# Patient Record
Sex: FEMALE | Race: WHITE | NOT HISPANIC OR LATINO | Employment: OTHER | URBAN - METROPOLITAN AREA
[De-identification: names, ages, dates, MRNs, and addresses within clinical notes are randomized per-mention and may not be internally consistent; named-entity substitution may affect disease eponyms.]

---

## 2018-10-30 ENCOUNTER — OFFICE VISIT (OUTPATIENT)
Dept: INTERNAL MEDICINE | Facility: CLINIC | Age: 58
End: 2018-10-30
Payer: COMMERCIAL

## 2018-10-30 ENCOUNTER — HOSPITAL ENCOUNTER (EMERGENCY)
Facility: HOSPITAL | Age: 58
Discharge: HOME | End: 2018-10-30
Attending: EMERGENCY MEDICINE
Payer: COMMERCIAL

## 2018-10-30 VITALS
SYSTOLIC BLOOD PRESSURE: 131 MMHG | HEART RATE: 90 BPM | DIASTOLIC BLOOD PRESSURE: 74 MMHG | OXYGEN SATURATION: 99 % | WEIGHT: 132 LBS | TEMPERATURE: 98 F | BODY MASS INDEX: 21.21 KG/M2 | RESPIRATION RATE: 18 BRPM | HEIGHT: 66 IN

## 2018-10-30 VITALS
DIASTOLIC BLOOD PRESSURE: 90 MMHG | WEIGHT: 140.88 LBS | HEART RATE: 115 BPM | HEIGHT: 64 IN | OXYGEN SATURATION: 100 % | BODY MASS INDEX: 24.05 KG/M2 | SYSTOLIC BLOOD PRESSURE: 140 MMHG

## 2018-10-30 DIAGNOSIS — G89.29 CHRONIC BILATERAL LOW BACK PAIN WITH BILATERAL SCIATICA: Primary | ICD-10-CM

## 2018-10-30 DIAGNOSIS — F41.9 ANXIETY: Primary | ICD-10-CM

## 2018-10-30 DIAGNOSIS — M54.41 CHRONIC BILATERAL LOW BACK PAIN WITH BILATERAL SCIATICA: Primary | ICD-10-CM

## 2018-10-30 DIAGNOSIS — M54.40 ACUTE LEFT-SIDED LOW BACK PAIN WITH SCIATICA, SCIATICA LATERALITY UNSPECIFIED: ICD-10-CM

## 2018-10-30 DIAGNOSIS — M54.42 CHRONIC BILATERAL LOW BACK PAIN WITH BILATERAL SCIATICA: Primary | ICD-10-CM

## 2018-10-30 PROCEDURE — 99283 EMERGENCY DEPT VISIT LOW MDM: CPT | Mod: ,,, | Performed by: NURSE PRACTITIONER

## 2018-10-30 PROCEDURE — 25000003 PHARM REV CODE 250: Performed by: NURSE PRACTITIONER

## 2018-10-30 PROCEDURE — 99999 PR PBB SHADOW E&M-EST. PATIENT-LVL III: CPT | Mod: PBBFAC,,, | Performed by: STUDENT IN AN ORGANIZED HEALTH CARE EDUCATION/TRAINING PROGRAM

## 2018-10-30 PROCEDURE — 3008F BODY MASS INDEX DOCD: CPT | Mod: CPTII,S$GLB,, | Performed by: STUDENT IN AN ORGANIZED HEALTH CARE EDUCATION/TRAINING PROGRAM

## 2018-10-30 PROCEDURE — 99203 OFFICE O/P NEW LOW 30 MIN: CPT | Mod: S$GLB,,, | Performed by: STUDENT IN AN ORGANIZED HEALTH CARE EDUCATION/TRAINING PROGRAM

## 2018-10-30 PROCEDURE — 99283 EMERGENCY DEPT VISIT LOW MDM: CPT

## 2018-10-30 RX ORDER — HYDROCODONE BITARTRATE AND ACETAMINOPHEN 5; 325 MG/1; MG/1
1 TABLET ORAL
Status: COMPLETED | OUTPATIENT
Start: 2018-10-30 | End: 2018-10-30

## 2018-10-30 RX ORDER — METHOCARBAMOL 750 MG/1
750 TABLET, FILM COATED ORAL 3 TIMES DAILY
Qty: 42 TABLET | Refills: 0 | Status: SHIPPED | OUTPATIENT
Start: 2018-10-30 | End: 2018-11-13

## 2018-10-30 RX ORDER — CLONAZEPAM 1 MG/1
0.5 TABLET ORAL 2 TIMES DAILY PRN
Qty: 30 TABLET | Refills: 0 | Status: SHIPPED | OUTPATIENT
Start: 2018-10-30 | End: 2019-01-05 | Stop reason: SDUPTHER

## 2018-10-30 RX ADMIN — HYDROCODONE BITARTRATE AND ACETAMINOPHEN 1 TABLET: 5; 325 TABLET ORAL at 10:10

## 2018-10-30 NOTE — ED TRIAGE NOTES
Patient presents with c/o lumbar back pain that radiates down bilateral lower extremities. Patient reports the pain started around two weeks ago. Patient reports numbness and tingling to BLE. Patient ambulating without difficulty. Patient describes the pain as sharp and burning. Patient rates the pain 6/10 at this time. Patient is A&Ox4 and following commands.

## 2018-10-30 NOTE — PROGRESS NOTES
"Subjective:       Patient ID: Ruby Angela is a 58 y.o. female.    Chief Complaint: Anxiety and Back Pain    HPI   58-year-old female with medical history significant for anxiety who is presenting as an urgent care visit for anxiety and lower back pain. The patient states she is in town with her  who is here for a liver transplant (second transplant) and is extremely anxious as a result of this. She states since he's going for his second transplant she is worried about it. Regarding her lower back pain she states the back pain has been on and off x 3 months, radiates down her left leg to her great toe. She has not tired anything for her back pain; is allergic to naproxen/NSAIDS.       Denies bowel or bladder incontinence, numbness, tingling, difficulty ambulating.        Review of Systems   Constitutional: Positive for fatigue. Negative for chills and fever.   HENT: Positive for rhinorrhea. Negative for postnasal drip, sore throat, trouble swallowing and voice change.    Eyes: Negative for photophobia and visual disturbance.   Respiratory: Negative for cough, chest tightness and shortness of breath.    Cardiovascular: Negative for chest pain, palpitations and leg swelling.   Gastrointestinal: Negative for abdominal pain, constipation, diarrhea, nausea and vomiting.   Endocrine: Negative for polydipsia, polyphagia and polyuria.   Genitourinary: Negative for dysuria and hematuria.   Musculoskeletal: Positive for back pain. Negative for neck pain and neck stiffness.   Skin: Negative for color change and rash.   Neurological: Negative for weakness and numbness.   Psychiatric/Behavioral: Negative for confusion, decreased concentration and hallucinations. The patient is nervous/anxious.        Objective:       Vitals:    10/30/18 1524   BP: (!) 140/90   BP Location: Right arm   Patient Position: Sitting   BP Method: Medium (Manual)   Pulse: (!) 115   SpO2: 100%   Weight: 63.9 kg (140 lb 14 oz)   Height: 5' 4" " (1.626 m)       Physical Exam   Constitutional: She is oriented to person, place, and time. She appears well-developed and well-nourished.   HENT:   Head: Normocephalic and atraumatic.   Eyes: EOM are normal. Pupils are equal, round, and reactive to light.   Neck: Normal range of motion. Neck supple.   Cardiovascular: Normal rate, regular rhythm and normal heart sounds.   Pulmonary/Chest: Effort normal and breath sounds normal.   Abdominal: Soft. Bowel sounds are normal.   Musculoskeletal: Normal range of motion. She exhibits no edema.   Neurological: She is alert and oriented to person, place, and time.   Psychiatric: Her speech is normal and behavior is normal. Judgment and thought content normal. Her mood appears anxious. She is not actively hallucinating. She is attentive.       Assessment:       1. Anxiety    2. Acute left-sided low back pain with sciatica, sciatica laterality unspecified        Plan:       Anxiety   - patient extremely anxious regarding her husbands health, tearful on physical exam.    - states she will be here for ~ 1 month and not able to return home to see her PCP   - will prescribe klonopin 0.5mg - 30 tablets    Low back pain/without acute neurological abnormalities   - robaxin    - patient states she is scheduled for an x-ray by her PCP but has not been able to get it as she has been taking care of her          Humberto Lim MD     Internal Medicine PGY-2       I have personally seen and examined patient and agree with the A/P as noted above.     Anca Adame

## 2018-10-30 NOTE — DISCHARGE INSTRUCTIONS
Our goal in the emergency department is to always give you outstanding care and exceptional service. You may receive a survey by mail or e-mail in the next week regarding your experience in our ED. We would greatly appreciate your completing and returning the survey. Your feedback provides us with a way to recognize our staff who give very good care and it helps us learn how to improve when your experience was below our aspiration of excellence.     Please follow up with internal medicine within the next week for your low back pain. Please return to the ED if you experience difficulty walking, numbness, tingling, fever or chills .

## 2018-10-30 NOTE — ED NOTES
LOC: The patient is awake, alert and aware of environment with an appropriate affect, the patient is oriented x 3 and speaking appropriately. PERRLA present. Patient reports numbness and tingling in BLE. Patient with symmetrical facial expression. Equal hand grasps bilaterally.  APPEARANCE: Patient resting comfortably and in no acute distress, patient is clean and well groomed, patient's clothing is properly fastened.  SKIN: The skin is warm and dry, patient has normal skin turgor and moist mucus membranes, skin intact, no breakdown or brusing noted.  MUSCULOSKELETAL: Patient moving all extremities well, no obvious swelling or deformities noted. Patient reports pain with ambulation.   RESPIRATORY: Airway is open and patent, respirations are spontaneous, patient has a normal effort and rate. Breath sounds are clear and equal bilaterally.  CARDIAC: Normal heart sounds. No peripheral edema. Denies chest pain and SOB.   ABDOMEN: Soft and non tender to palpation, no distention noted. Bowel sounds present. Denies NVD.

## 2018-11-07 ENCOUNTER — OFFICE VISIT (OUTPATIENT)
Dept: INTERNAL MEDICINE | Facility: CLINIC | Age: 58
End: 2018-11-07
Payer: COMMERCIAL

## 2018-11-07 VITALS
BODY MASS INDEX: 24.32 KG/M2 | OXYGEN SATURATION: 97 % | HEIGHT: 64 IN | HEART RATE: 98 BPM | DIASTOLIC BLOOD PRESSURE: 88 MMHG | SYSTOLIC BLOOD PRESSURE: 130 MMHG | TEMPERATURE: 99 F | WEIGHT: 142.44 LBS

## 2018-11-07 DIAGNOSIS — G43.909 MIGRAINE WITHOUT STATUS MIGRAINOSUS, NOT INTRACTABLE, UNSPECIFIED MIGRAINE TYPE: ICD-10-CM

## 2018-11-07 DIAGNOSIS — F41.9 ANXIETY: ICD-10-CM

## 2018-11-07 DIAGNOSIS — Z72.0 TOBACCO USE: ICD-10-CM

## 2018-11-07 PROCEDURE — 3008F BODY MASS INDEX DOCD: CPT | Mod: CPTII,S$GLB,, | Performed by: FAMILY MEDICINE

## 2018-11-07 PROCEDURE — 99999 PR PBB SHADOW E&M-EST. PATIENT-LVL IV: CPT | Mod: PBBFAC,,, | Performed by: FAMILY MEDICINE

## 2018-11-07 PROCEDURE — 99213 OFFICE O/P EST LOW 20 MIN: CPT | Mod: S$GLB,,, | Performed by: FAMILY MEDICINE

## 2018-11-07 RX ORDER — ESCITALOPRAM OXALATE 10 MG/1
10 TABLET ORAL DAILY
Qty: 30 TABLET | Refills: 11 | Status: SHIPPED | OUTPATIENT
Start: 2018-11-07 | End: 2019-01-25 | Stop reason: ALTCHOICE

## 2018-11-07 NOTE — PROGRESS NOTES
"Subjective:      Patient ID: Ruby Angela is a 58 y.o. female.    Chief Complaint: Follow-up (anxiety)      HPI:  Ruby Angela is a 58 year old female with anxiety, headaches, and tobacco use who presents to clinic today for follow up on anxiety and requesting medication prescription.    Anxiety:  Lives in [place], but in town with her  for liver transplant.  States she is anxious about his liver transplant and his medical care.  Seen 10/30/18 in resident clinic for same issue, given klonopin 0.5 mg 1/2 tab by mouth twice daily as needed for anxiety, quantity 30, 0 refills.  States she feels the klonopin wasn't strong enough so she was taking 3-4 tabs at a time and has since run out after 9 days.  Will be here for another month.  States her anxiety manifests itself as crying spells; becomes tearful when asked how her anxiety manifests itself for a few seconds.  States the medication makes her feel calm.  Endorses a history of ADD as well and states the medication also helps with symptoms of being too hyper.  When asked initially what other medications she has used in the past for anxiety she states Wellbutrin but that this made her anxiety worse.  When discussing alternate medications to try such as SSRI's patient then states she has tried "all of those" and that they did not help.  When asked which SSRIs she has tried she states Celexa.  Denies associated depression, homicidal ideation or suicidal ideation.    Back pain:  Stable.  Given robaxin 750 mg by mouth three times daily at last visit.  Also has presented to ED for this issue and was given a dose of hydrocodone-acetaminophen at that time.  States she cannot take NSAIDs, gabapentin, naproxen.      Headahces:  Requests refills on Fioricet; states her PCP at home prescribes this for her and she would like a refill.  Symptoms stable.    Past Medical History:   Diagnosis Date    Anxiety     Migraines        Past Surgical History:   Procedure Laterality " Date    HYSTERECTOMY         History reviewed. No pertinent family history.    Social History     Socioeconomic History    Marital status:      Spouse name: None    Number of children: None    Years of education: None    Highest education level: None   Social Needs    Financial resource strain: None    Food insecurity - worry: None    Food insecurity - inability: None    Transportation needs - medical: None    Transportation needs - non-medical: None   Occupational History    None   Tobacco Use    Smoking status: Current Every Day Smoker     Packs/day: 0.50     Types: Cigarettes    Smokeless tobacco: Never Used   Substance and Sexual Activity    Alcohol use: No     Frequency: Never    Drug use: No    Sexual activity: None   Other Topics Concern    None   Social History Narrative    None       Review of Systems   Constitutional: Negative for chills, fatigue and fever.   HENT: Negative for congestion, hearing loss, nosebleeds, rhinorrhea, sore throat and trouble swallowing.    Eyes: Negative for pain and visual disturbance.   Respiratory: Negative for cough, shortness of breath and wheezing.    Cardiovascular: Negative for chest pain and palpitations.   Gastrointestinal: Negative for abdominal distention, abdominal pain, constipation, diarrhea, nausea and vomiting.   Genitourinary: Negative for decreased urine volume, difficulty urinating, dysuria, hematuria and urgency.   Musculoskeletal: Positive for back pain. Negative for arthralgias and myalgias.   Skin: Negative for color change and rash.   Neurological: Positive for headaches. Negative for dizziness, tremors, weakness, light-headedness and numbness.   Psychiatric/Behavioral: Negative for agitation, behavioral problems and confusion. The patient is nervous/anxious.      Objective:     Vitals:    11/07/18 0814   BP: 130/88   BP Location: Left arm   Patient Position: Sitting   BP Method: Medium (Manual)   Pulse: 98   Temp: 99 °F (37.2 °C)  "  SpO2: 97%   Weight: 64.6 kg (142 lb 6.7 oz)   Height: 5' 4" (1.626 m)       Physical Exam   Constitutional: She is oriented to person, place, and time. She appears well-developed and well-nourished.   HENT:   Head: Normocephalic and atraumatic.   Right Ear: External ear normal.   Left Ear: External ear normal.   Nose: Nose normal.   Mouth/Throat: Oropharynx is clear and moist.   Eyes: Conjunctivae and EOM are normal. Pupils are equal, round, and reactive to light.   Neck: Normal range of motion. Neck supple. No tracheal deviation present.   Cardiovascular: Normal rate, regular rhythm and normal heart sounds. Exam reveals no gallop and no friction rub.   No murmur heard.  Pulmonary/Chest: Effort normal and breath sounds normal. No respiratory distress. She has no wheezes. She has no rales.   Abdominal: Soft. Bowel sounds are normal. She exhibits no distension. There is no tenderness. There is no rebound and no guarding.   Musculoskeletal: Normal range of motion. She exhibits no edema or deformity.   Neurological: She is alert and oriented to person, place, and time. Coordination normal.   Skin: Skin is warm and dry.   Psychiatric: She has a normal mood and affect. Her behavior is normal.   Nursing note and vitals reviewed.     Assessment:      1. Anxiety    2. Tobacco use    3. Migraine without status migrainosus, not intractable, unspecified migraine type      Plan:   Ruby was seen today for follow-up.    Diagnoses and all orders for this visit:    Anxiety  -     escitalopram oxalate (LEXAPRO) 10 MG tablet; Take 1 tablet (10 mg total) by mouth once daily.  -     Ambulatory Referral to Psychiatry  -     Counseled patient on my concerns for medication tolerance, habituation, and addiction with patient requesting refills on other controlled substances and taking medication not as prescribed without notifying her physicians.  Refill/increase on klonopin denied.  Will start escitalopram 10 mg.  Referred to psychiatry " as well for further evaluation.    Tobacco use        -     Counseled patient on the importance of tobacco cessation; states she plans to stop smoking after her 's transplant.    Migraine without status migrainosus, not intractable, unspecified migraine type        -     Stable.   reviewed; patient received refills on butalbital-acetaminophen-caffeine -40 mg quantity 20 refills 8 on 10/13/18 per Dr. Ruth Vaughn in [place]; when notifying patient that she should have refills left she asks if it is too early to  refills on this.  Notified patient that she should contact her pharmacy to discuss this and have them transfer her prescription to a local pharmacy.  Refill request denied.

## 2018-11-12 ENCOUNTER — OFFICE VISIT (OUTPATIENT)
Dept: PSYCHIATRY | Facility: CLINIC | Age: 58
End: 2018-11-12
Payer: COMMERCIAL

## 2018-11-12 VITALS
HEART RATE: 82 BPM | HEIGHT: 64 IN | SYSTOLIC BLOOD PRESSURE: 146 MMHG | DIASTOLIC BLOOD PRESSURE: 85 MMHG | BODY MASS INDEX: 23.56 KG/M2 | WEIGHT: 138 LBS

## 2018-11-12 DIAGNOSIS — F43.22 ADJUSTMENT DISORDER WITH ANXIETY: ICD-10-CM

## 2018-11-12 PROBLEM — F41.1 GAD (GENERALIZED ANXIETY DISORDER): Status: ACTIVE | Noted: 2018-11-12

## 2018-11-12 PROCEDURE — 3008F BODY MASS INDEX DOCD: CPT | Mod: CPTII,S$GLB,, | Performed by: NURSE PRACTITIONER

## 2018-11-12 PROCEDURE — 99204 OFFICE O/P NEW MOD 45 MIN: CPT | Mod: S$GLB,,, | Performed by: NURSE PRACTITIONER

## 2018-11-12 PROCEDURE — 99999 PR PBB SHADOW E&M-EST. PATIENT-LVL III: CPT | Mod: PBBFAC,,, | Performed by: NURSE PRACTITIONER

## 2018-11-12 RX ORDER — PAROXETINE 10 MG/1
10 TABLET, FILM COATED ORAL EVERY MORNING
Qty: 30 TABLET | Refills: 0 | Status: SHIPPED | OUTPATIENT
Start: 2018-11-12 | End: 2018-12-04

## 2018-11-12 NOTE — PROGRESS NOTES
"11/13/2018 1:08 PM   Ruby Angela   1960   [id number]           OUTPATIENT PSYCHIATRY INITIAL EVALUATION NOTE      Ruby Angela, a 58 y.o. female, presenting for initial evaluation visit. Met with patient.    Reason for Encounter: self-referral. Patient complains of anxiety."My  got his second liver transplant and he is in the ICU and I feel very anxious"  History of Present Illness:    Today  Patient is 58 y.o female without a psychiatric history presented to [facility] with c/o increased anxiety due to 's illness. She stated that her  is currently at the ICU unit at [facility] due to "second time liver transplant". She stated that she has been  for 10 years and was/is feeling worried about losing her . Stated that she is from [facility] and feels isolated and lonely at her new environment. She reported anxiety symptoms of decreased concentration, decreased sleep, uncontrolled worry, restlessness, feelings on edge and inability to relax for 2 months but worsening in the past 2 weeks. Discussed deep breathing, positive self talk, increasing contact with her support system/family and utilization of effective coping strategies. She denied feeling depressed but she endorsed crying spells and feelings of sadness. She denied feelings of hopelessness, guilt, worthlessness, anhedonia or amotivation. Rated her recent anxiety at 8/10 with 10/10 being the most severe. Denied SI/HI/VH/AH and no delusions noted or reported. Denied drinking alcohol or any type of drugs. Stated that she is taking clonopin 1mg po BID after her recent visit to ED due to worsening of anxiety. She complained of migraines and reported taking Fioricet. Informed the patient of the risks of taking clonopin with Fioricet in regard to CNS depression and she verbalized her understanding. She stated that she was given lexapro and stated that it was not effective. Educated the patient about how long " it takes for the SSRI's to work. She was interested in stopping lexapro and starting Paxil 10mg po daily.     Psychosocial stressors: Patient is away from home [place]; patient's  is in ICU due to liver transplant for the second time.     Psychiatric Review Of Systems - Is patient experiencing or having changes in:    Symptoms of Depression: No diminished mood or loss of interest/anhedonia; irritability, diminished energy, change in sleep, change in appetite, diminished concentration or cognition or indecisiveness, PMA/R, excessive guilt or hopelessness or worthlessness. Denied suicidal ideations and denied Suicide attempts.      Symptoms of DAIN: Endorsed excessive anxiety/worry/fear, more days than not, about numerous issues, difficult to control, with restlessness, fatigue, poor concentration, irritability, muscle tension, sleep disturbance; causes functionally impairing distress   Onset was approximately 1 month ago. Symptoms have been rapidly worsening since that time.      Symptoms of saroj or hypomania: No elevated, expansive, or irritable mood with increased energy or activity; with inflated self-esteem or grandiosity, decreased need for sleep, increased rate of speech,  racing thoughts, distractibility, increased goal directed activity or PMA, risky/disinhibited behavior.      Symptoms of psychosis: No hallucinations, delusions, disorganized thinking, disorganized behavior or abnormal motor behavior, or negative symptoms (diminished emotional expression, avolition, anhedonia, alogia, asociality.      Sleep: Reported getting 5-6  hours of sleep per night with early morning awakening with inability to return to sleep due to worry about .     Risk Parameters:  Patient reports no suicidal ideation  Patient reports no homicidal ideation  Patient reports no self-injurious behavior  Patient reports no violent behavior    Other symptoms    Symptoms of Panic Disorder: No recurrent panic attacks,  precipitated or un-precipitated, source of worry and/or behavioral changes secondary; with or without agoraphobia    Symptoms of PTSD: No h/o trauma; re-experiencing/intrusive symptoms, avoidant behavior, negative alterations in cognition or mood, or hyperarousal symptoms; with or without dissociative symptoms     Symptoms of OCD: NO obsessions, compulsions or ruminations    Symptoms of Eating Disorders:No anorexia, bulimia or binging    Symptoms of ADHD: No inattention or hyperactivity    Substance Use:   No intoxication, withdrawal, tolerance, used in larger amounts or duration than intended, unsuccessful attempts to limit or quit, increased time engaging in or seeking out, cravings or strong desire to use, failure to fulfill obligations, negative consequences in social/interpersonal/occupational,/recreational areas, use in dangerous situations, medical or psychological consequences     Psychotropic medication review  Previous Trials-lexapro 10mg po daily, clonopin 1mg BID  Current meds-Fioricet as needed for migraines, clonopin lexapro    HISTORY     Past Medical History:   Diagnosis Date    Anxiety     Migraines          History of Seizures or TBI:No    Past Surgical History:   Procedure Laterality Date    HYSTERECTOMY         No family history on file.    Social History     Socioeconomic History    Marital status:      Spouse name: Not on file    Number of children: Not on file    Years of education: Not on file    Highest education level: Not on file   Social Needs    Financial resource strain: Not on file    Food insecurity - worry: Not on file    Food insecurity - inability: Not on file    Transportation needs - medical: Not on file    Transportation needs - non-medical: Not on file   Occupational History    Not on file   Tobacco Use    Smoking status: Current Every Day Smoker     Packs/day: 0.50     Types: Cigarettes    Smokeless tobacco: Never Used   Substance and Sexual Activity     "Alcohol use: No     Frequency: Never    Drug use: No    Sexual activity: Not on file   Other Topics Concern    Not on file   Social History Narrative    Not on file           OBJECTIVE       Constitutional  Vitals:  Most recent vital signs, dated less than 90 days prior to this appointment, were reviewed.    Vitals:    11/12/18 1246   BP: (!) 146/85   Pulse: 82   Weight: 62.6 kg (138 lb 0.1 oz)   Height: 5' 4" (1.626 m)            Laboratory Data: Reviewed most recent     Medications:  Outpatient Encounter Medications as of 11/12/2018   Medication Sig Dispense Refill    acyclovir (ZOVIRAX) 400 MG tablet take 1 tablet by mouth 4 times daily for 5 days  20 tablet 0    butalbital-acetaminophen-caff -40 mg Cap Take 1 capsule by mouth once daily as needed for headache 20 capsule 0    clonazePAM (KLONOPIN) 1 MG tablet Take 0.5 tablets (0.5 mg total) by mouth 2 (two) times daily as needed for Anxiety. 30 tablet 0    escitalopram oxalate (LEXAPRO) 10 MG tablet Take 1 tablet (10 mg total) by mouth once daily. 30 tablet 11    methocarbamol (ROBAXIN) 750 MG Tab Take 1 tablet (750 mg total) by mouth 3 (three) times daily. for 14 days 42 tablet 0    paroxetine (PAXIL) 10 MG tablet Take 1 tablet (10 mg total) by mouth every morning. 30 tablet 0     No facility-administered encounter medications on file as of 11/12/2018.        Allergy:  Review of patient's allergies indicates:   Allergen Reactions    Naproxen Hives and Itching       Nutritional Screening: Considering the patient's height and weight, medications, medical history and preferences, should a referral be made to the dietitian? no    Review of Systems:  General: unremarkable, age appropriate  Resp:  No shortness of breath, hyperventilation or cough  Cvs:  No tachycardia or chest pain  Gi:  No nausea, vomiting, pain, constipation or diarrhea  Musculoskeletal:  No pain or stiffness of the joints  Muscle Strength/Tone:no dyskinesia, no dystonia, no " "tremor  Neurological:  No weakness, sensory changes, seizures, confusion, memory loss, tremor or other abnormal movements   Gait & Station:non-ataxic    AIMS:  n/a     Mental Status Exam:  Appearance: unremarkable, age appropriate, casually dressed  Behavior/Cooperation:appropriate friendly and cooperative   Speech: appropriate rate, volume and tone spontaneous   Language: uses words appropriately; NO aphasia or dysarthria  Mood: "anxious "  Affect:  congruent with mood and appropriate to situation/content  Thought Process:  normal and logical  Thought Content: normal, no suicidality, no homicidality, delusions, or paranoia  Sensorium:  Awake  Alert and Oriented: x3 grossly intact  Memory: Intact to conversation both recent and remote  Attention/concentration: appropriate for age/education.   Insight: Intact  Judgment:Intact      Strengths and Liabilities: Strength: Patient accepts guidance/feedback, Strength: Patient is expressive/articulate., Strength: Patient is motivated for change., Liability: Patient lacks coping skills.    ASSESSMENT     Impression: Patient is 58 y.o female who is from [facility] who is staying in [place] due to husbands liver transplant two weeks ago. She presented with anxiety symptoms that did meet the clinical criteria of generalized anxiety disorder because the anxiety symptoms have not been present for 6 months and have been only present for 2 months. Therefore, she meets the clinical presentation of adjustment disorder with anxious mood.        ICD-10-CM ICD-9-CM   1. Adjustment disorder with anxiety F43.22 309.24       Treatment Goals:  Specify outcomes written in observable, behavioral terms:   Anxiety: acquiring relapse prevention skills, reducing negative automatic thoughts and reducing physical symptoms of anxiety    TREATMENT PLAN     · Medication Management:   · Stop lexparo 10mg po  · Start Paxil 10 mg po daily  · Stated that she is allergic to Benadryl and was not " interested in vistaril   · Encouraged patient to utilize her support system to decrease feelings of aloneness   · Labs: reviewed most recent labs  · The treatment plan and follow up plan were reviewed with the patient.  · Discussed with patient informed consent, risks vs. benefits, alternative treatments, side effect profile and the inherent unpredictability of individual responses to these treatments. The patient expresses understanding of the above and displays the capacity to agree with this current plan and had no other questions.  · Encouraged Patient to keep future appointments.   · Take medications as prescribed and abstain from substance abuse.   · In the event of an emergency patient was advised to go to the emergency room.  · Referral for further treatment to social work team for psychotherapy    Return to Clinic:  1 month    > than 50% of total time spend on coordination of care and counseling   (which included pts differential diagnosis and prognosis for psychiatric conditions, risks, benefits of treatments, instructions and adherence to treatment plan, risk reduction, reviewing current psychiatric medication regimen, medical problems and social stressors. In addition to possible discussion with other healthcare provider/s)    Add on Psychotherapy time: 33  Total Face to face time: 60mins    Zuleyma Carrion MS, MSN, LPC, APRN, PMHNP-BC  [facility]   11/13/2018 1:08 PM

## 2018-11-13 NOTE — PATIENT INSTRUCTIONS
Paroxetine tablets  What is this medicine?  PAROXETINE (pa KIM e teen) is used to treat depression. It may also be used to treat anxiety disorders, obsessive compulsive disorder, panic attacks, post traumatic stress, and premenstrual dysphoric disorder (PMDD).  How should I use this medicine?  Take this medicine by mouth with a glass of water. Follow the directions on the prescription label. You can take it with or without food. Take your medicine at regular intervals. Do not take your medicine more often than directed. Do not stop taking this medicine suddenly except upon the advice of your doctor. Stopping this medicine too quickly may cause serious side effects or your condition may worsen.  A special MedGuide will be given to you by the pharmacist with each prescription and refill. Be sure to read this information carefully each time.  Talk to your pediatrician regarding the use of this medicine in children. Special care may be needed.  What side effects may I notice from receiving this medicine?  Side effects that you should report to your doctor or health care professional as soon as possible:  · allergic reactions like skin rash, itching or hives, swelling of the face, lips, or tongue  · black or bloody stools, blood in the urine or vomit  · fast, irregular heartbeat  · hallucination, loss of contact with reality  · painful or prolonged erection (men)  · seizures  · suicidal thoughts or other mood changes  · trouble passing urine or change in the amount of urine  · unusual bleeding or bruising  · unusually weak or tired  · vomiting  Side effects that usually do not require medical attention (report to your doctor or health care professional if they continue or are bothersome):  · change in appetite, weight  · change in sex drive or performance  · constipation or diarrhea  · difficulty sleeping  · drowsy  · headache  · increased sweating  · muscle pain or weakness  · tremors  What may interact with this  medicine?  Do not take this medicine with any of the following medications:  · linezolid  · MAOIs like Carbex, Eldepryl, Marplan, Nardil, and Parnate  · methylene blue (injected into a vein)  · pimozide  · thioridazine  This medicine may also interact with the following medications:  · alcohol  · aspirin and aspirin-like medicines  · atomoxetine  · certain medicines for depression, anxiety, or psychotic disturbances  · certain medicines for irregular heart beat like propafenone, flecainide, encainide, and quinidine  · certain medicines for migraine headache like almotriptan, eletriptan, frovatriptan, naratriptan, rizatriptan, sumatriptan, zolmitriptan  · cimetidine  · digoxin  · diuretics  · fentanyl  · fosamprenavir/ritonavir  · furazolidone  · isoniazid  · lithium  · medicines that treat or prevent blood clots like warfarin, enoxaparin, and dalteparin  · medicines for sleep  · metoprolol  · NSAIDs, medicines for pain and inflammation, like ibuprofen or naproxen  · phenobarbital  · phenytoin  · procarbazine  · procyclidine  · rasagiline  · supplements like [place]'s wort, kava kava, valerian  · tamoxifen  · theophylline  · tramadol  · tryptophan  What if I miss a dose?  If you miss a dose, take it as soon as you can. If it is almost time for your next dose, take only that dose. Do not take double or extra doses.  Where should I keep my medicine?  Keep out of the reach of children.  Store at room temperature between 15 and 30 degrees C (59 and 86 degrees F). Keep container tightly closed. Throw away any unused medicine after the expiration date.  What should I tell my health care provider before I take this medicine?  They need to know if you have any of these conditions:  · bleeding disorders  · glaucoma  · heart disease  · kidney disease  · liver disease  · low levels of sodium in the blood  · saroj or bipolar disorder  · seizures  · suicidal thoughts, plans, or attempt; a previous suicide attempt by you or a  family member  · take MAOIs like Carbex, Eldepryl, Marplan, Nardil, and Parnate  · take medicines that treat or prevent blood clots  · an unusual or allergic reaction to paroxetine, other medicines, foods, dyes, or preservatives  · pregnant or trying to get pregnant  · breast-feeding  What should I watch for while using this medicine?  Tell your doctor if your symptoms do not get better or if they get worse. Visit your doctor or health care professional for regular checks on your progress. Because it may take several weeks to see the full effects of this medicine, it is important to continue your treatment as prescribed by your doctor.  Patients and their families should watch out for new or worsening thoughts of suicide or depression. Also watch out for sudden changes in feelings such as feeling anxious, agitated, panicky, irritable, hostile, aggressive, impulsive, severely restless, overly excited and hyperactive, or not being able to sleep. If this happens, especially at the beginning of treatment or after a change in dose, call your health care professional.  You may get drowsy or dizzy. Do not drive, use machinery, or do anything that needs mental alertness until you know how this medicine affects you. Do not stand or sit up quickly, especially if you are an older patient. This reduces the risk of dizzy or fainting spells. Alcohol may interfere with the effect of this medicine. Avoid alcoholic drinks.  Your mouth may get dry. Chewing sugarless gum or sucking hard candy, and drinking plenty of water will help. Contact your doctor if the problem does not go away or is severe.  NOTE:This sheet is a summary. It may not cover all possible information. If you have questions about this medicine, talk to your doctor, pharmacist, or health care provider. Copyright© 2017 [employer]

## 2018-12-04 DIAGNOSIS — F43.22 ADJUSTMENT DISORDER WITH ANXIETY: ICD-10-CM

## 2018-12-04 RX ORDER — PAROXETINE 10 MG/1
10 TABLET, FILM COATED ORAL EVERY MORNING
Qty: 30 TABLET | Refills: 0 | Status: SHIPPED | OUTPATIENT
Start: 2018-12-04 | End: 2019-01-03

## 2018-12-18 ENCOUNTER — OFFICE VISIT (OUTPATIENT)
Dept: INTERNAL MEDICINE | Facility: CLINIC | Age: 58
End: 2018-12-18
Payer: COMMERCIAL

## 2018-12-18 VITALS
SYSTOLIC BLOOD PRESSURE: 128 MMHG | WEIGHT: 137.56 LBS | BODY MASS INDEX: 22.92 KG/M2 | OXYGEN SATURATION: 98 % | HEART RATE: 90 BPM | HEIGHT: 65 IN | DIASTOLIC BLOOD PRESSURE: 82 MMHG

## 2018-12-18 PROCEDURE — 99999 PR PBB SHADOW E&M-EST. PATIENT-LVL III: CPT | Mod: PBBFAC,,, | Performed by: PHYSICIAN ASSISTANT

## 2018-12-18 PROCEDURE — 3008F BODY MASS INDEX DOCD: CPT | Mod: CPTII,S$GLB,, | Performed by: PHYSICIAN ASSISTANT

## 2018-12-18 PROCEDURE — 99213 OFFICE O/P EST LOW 20 MIN: CPT | Mod: S$GLB,,, | Performed by: PHYSICIAN ASSISTANT

## 2018-12-18 RX ORDER — SUMATRIPTAN 50 MG/1
50 TABLET, FILM COATED ORAL DAILY PRN
Qty: 12 TABLET | Refills: 0 | Status: SHIPPED | OUTPATIENT
Start: 2018-12-18 | End: 2018-12-31

## 2018-12-18 NOTE — PROGRESS NOTES
"Subjective:       Patient ID: Ruby Angela is a 58 y.o. female.    Chief Complaint: Migraine    HPI     Pt from [place] here in [place] with her  who recently had 2nd liver transplant. She has a hx of anxiety and migraines. Recently saw Psychiatry and stated on paxil for anxiety. Today she c/o chronic migraines, typically relieved with Fioricet prescribed by her PCP in [place] but she states lately med has been ineffective. Character of migraines unchanged, frontal with associate photophobia. Recent triggers is stress related with concern for her husbands health. She has been working to decrease caffeine intake and smoking. She has never been on preventative meds.     Review of patient's allergies indicates:   Allergen Reactions    Naproxen Hives and Itching     Patient Active Problem List   Diagnosis    Anxiety    Tobacco use    Migraines    DAIN (generalized anxiety disorder)     Social History     Tobacco Use    Smoking status: Current Every Day Smoker     Packs/day: 0.50     Types: Cigarettes    Smokeless tobacco: Never Used   Substance Use Topics    Alcohol use: No     Frequency: Never    Drug use: No         Review of Systems   Constitutional: Negative for chills, fever and unexpected weight change.   Eyes: Negative for visual disturbance.   Respiratory: Negative for cough, shortness of breath and wheezing.    Cardiovascular: Negative for chest pain and leg swelling.   Gastrointestinal: Negative for abdominal pain, nausea and vomiting.   Endocrine: Negative for polydipsia, polyphagia and polyuria.   Skin: Negative for rash.   Neurological: Positive for headaches. Negative for weakness and light-headedness.       Objective: /82   Pulse 90   Ht 5' 5" (1.651 m)   Wt 62.4 kg (137 lb 9.1 oz)   SpO2 98%   BMI 22.89 kg/m²         Physical Exam   Constitutional: She appears well-developed and well-nourished. No distress.   HENT:   Head: Normocephalic and atraumatic.   Mouth/Throat: " Oropharynx is clear and moist.   Eyes: Pupils are equal, round, and reactive to light.   Cardiovascular: Normal rate and regular rhythm. Exam reveals no friction rub.   No murmur heard.  Pulmonary/Chest: Effort normal and breath sounds normal. She has no wheezes. She has no rales.   Abdominal: Soft. Bowel sounds are normal. There is no tenderness.   Musculoskeletal: She exhibits no edema.   Neurological: She is alert.   Skin: Skin is warm and dry. Capillary refill takes less than 2 seconds. No rash noted.   Psychiatric: She has a normal mood and affect.   Vitals reviewed.      Assessment:       1. Chronic migraine        Plan:         Ruby was seen today for migraine.    Diagnoses and all orders for this visit:    Chronic migraine  -     sumatriptan (IMITREX) 50 MG tablet; Take 1 tablet (50 mg total) by mouth daily as needed for Migraine. May take additional dose after 2 hrs. Do not exceed 100mg in 24hrs.      Trial of imitrex for abortive relief for now  PCP f/u  ED precautions discussed      Luisa Riley PA-C

## 2019-01-02 RX ORDER — SUMATRIPTAN 50 MG/1
50 TABLET, FILM COATED ORAL DAILY PRN
Qty: 12 TABLET | Refills: 0 | Status: SHIPPED | OUTPATIENT
Start: 2019-01-02 | End: 2019-08-19 | Stop reason: ALTCHOICE

## 2019-01-03 DIAGNOSIS — F43.22 ADJUSTMENT DISORDER WITH ANXIETY: ICD-10-CM

## 2019-01-03 RX ORDER — PAROXETINE 10 MG/1
10 TABLET, FILM COATED ORAL EVERY MORNING
Qty: 30 TABLET | Refills: 0 | Status: SHIPPED | OUTPATIENT
Start: 2019-01-03 | End: 2019-01-25 | Stop reason: ALTCHOICE

## 2019-01-05 ENCOUNTER — OFFICE VISIT (OUTPATIENT)
Dept: INTERNAL MEDICINE | Facility: CLINIC | Age: 59
End: 2019-01-05
Payer: COMMERCIAL

## 2019-01-05 VITALS
HEART RATE: 100 BPM | DIASTOLIC BLOOD PRESSURE: 74 MMHG | BODY MASS INDEX: 21.74 KG/M2 | SYSTOLIC BLOOD PRESSURE: 136 MMHG | WEIGHT: 130.5 LBS | HEIGHT: 65 IN | TEMPERATURE: 99 F | OXYGEN SATURATION: 98 %

## 2019-01-05 DIAGNOSIS — F41.9 ANXIETY: Primary | ICD-10-CM

## 2019-01-05 DIAGNOSIS — F41.0 PANIC ATTACKS: ICD-10-CM

## 2019-01-05 PROCEDURE — 3008F PR BODY MASS INDEX (BMI) DOCUMENTED: ICD-10-PCS | Mod: CPTII,S$GLB,, | Performed by: INTERNAL MEDICINE

## 2019-01-05 PROCEDURE — 99999 PR PBB SHADOW E&M-EST. PATIENT-LVL III: ICD-10-PCS | Mod: PBBFAC,,, | Performed by: INTERNAL MEDICINE

## 2019-01-05 PROCEDURE — 99213 OFFICE O/P EST LOW 20 MIN: CPT | Mod: S$GLB,,, | Performed by: INTERNAL MEDICINE

## 2019-01-05 PROCEDURE — 99999 PR PBB SHADOW E&M-EST. PATIENT-LVL III: CPT | Mod: PBBFAC,,, | Performed by: INTERNAL MEDICINE

## 2019-01-05 PROCEDURE — 3008F BODY MASS INDEX DOCD: CPT | Mod: CPTII,S$GLB,, | Performed by: INTERNAL MEDICINE

## 2019-01-05 PROCEDURE — 99213 PR OFFICE/OUTPT VISIT, EST, LEVL III, 20-29 MIN: ICD-10-PCS | Mod: S$GLB,,, | Performed by: INTERNAL MEDICINE

## 2019-01-05 RX ORDER — CLONAZEPAM 1 MG/1
0.5 TABLET ORAL 2 TIMES DAILY PRN
Qty: 50 TABLET | Refills: 0 | Status: SHIPPED | OUTPATIENT
Start: 2019-01-05

## 2019-01-05 NOTE — PROGRESS NOTES
Subjective:       Patient ID: Ruby Angela is a 58 y.o. female.    Chief Complaint: Anxiety    Patient reports that her anxiety is back and she is having occasional panic attacks.  She is constantly with her  who is in hospital after second liver transplant.  Has a history of DAIN      Review of Systems   Constitutional: Negative for activity change, chills, fatigue and fever.   HENT: Negative for congestion, ear pain, nosebleeds, postnasal drip, sinus pressure and sore throat.    Eyes: Negative.  Negative for visual disturbance.   Respiratory: Negative for cough, chest tightness, shortness of breath and wheezing.    Cardiovascular: Negative for chest pain.   Gastrointestinal: Negative for abdominal pain, diarrhea, nausea and vomiting.   Genitourinary: Negative for difficulty urinating, dysuria, frequency and urgency.   Musculoskeletal: Negative for arthralgias and neck stiffness.   Skin: Negative for rash.   Neurological: Negative for dizziness, weakness and headaches.   Psychiatric/Behavioral: Negative for sleep disturbance. The patient is not nervous/anxious.        Objective:      Physical Exam   Constitutional: She is oriented to person, place, and time. She appears well-developed and well-nourished.  Non-toxic appearance. No distress.   HENT:   Head: Normocephalic and atraumatic.   Right Ear: Tympanic membrane, external ear and ear canal normal.   Left Ear: Tympanic membrane, external ear and ear canal normal.   Eyes: EOM are normal. Pupils are equal, round, and reactive to light. No scleral icterus.   Neck: Normal range of motion. Neck supple. No thyromegaly present.   Cardiovascular: Normal rate, regular rhythm and normal heart sounds.   Pulmonary/Chest: Effort normal and breath sounds normal.   Abdominal: Soft. Bowel sounds are normal. She exhibits no mass. There is no tenderness. There is no rebound.   Musculoskeletal: Normal range of motion.   Lymphadenopathy:     She has no cervical adenopathy.    Neurological: She is alert and oriented to person, place, and time. She has normal reflexes. She displays normal reflexes. No cranial nerve deficit. She exhibits normal muscle tone. Coordination normal.   Skin: Skin is warm and dry.   Psychiatric: She has a normal mood and affect. Her behavior is normal.       Assessment:       1. Anxiety    2. Panic attacks        Plan:   Ruby was seen today for anxiety.    Diagnoses and all orders for this visit:    Anxiety    Panic attacks    Other orders  -     clonazePAM (KLONOPIN) 1 MG tablet; Take 0.5 tablets (0.5 mg total) by mouth 2 (two) times daily as needed for Anxiety.

## 2019-01-25 ENCOUNTER — OFFICE VISIT (OUTPATIENT)
Dept: INTERNAL MEDICINE | Facility: CLINIC | Age: 59
End: 2019-01-25
Payer: COMMERCIAL

## 2019-01-25 VITALS
BODY MASS INDEX: 22.5 KG/M2 | HEART RATE: 100 BPM | TEMPERATURE: 98 F | OXYGEN SATURATION: 99 % | DIASTOLIC BLOOD PRESSURE: 88 MMHG | WEIGHT: 131.81 LBS | HEIGHT: 64 IN | SYSTOLIC BLOOD PRESSURE: 112 MMHG

## 2019-01-25 DIAGNOSIS — J00 ACUTE RHINITIS: ICD-10-CM

## 2019-01-25 DIAGNOSIS — F41.9 ANXIETY: Primary | ICD-10-CM

## 2019-01-25 PROCEDURE — 99213 OFFICE O/P EST LOW 20 MIN: CPT | Mod: S$GLB,,, | Performed by: PHYSICIAN ASSISTANT

## 2019-01-25 PROCEDURE — 3008F BODY MASS INDEX DOCD: CPT | Mod: CPTII,S$GLB,, | Performed by: PHYSICIAN ASSISTANT

## 2019-01-25 PROCEDURE — 99213 PR OFFICE/OUTPT VISIT, EST, LEVL III, 20-29 MIN: ICD-10-PCS | Mod: S$GLB,,, | Performed by: PHYSICIAN ASSISTANT

## 2019-01-25 PROCEDURE — 99999 PR PBB SHADOW E&M-EST. PATIENT-LVL IV: CPT | Mod: PBBFAC,,, | Performed by: PHYSICIAN ASSISTANT

## 2019-01-25 PROCEDURE — 99999 PR PBB SHADOW E&M-EST. PATIENT-LVL IV: ICD-10-PCS | Mod: PBBFAC,,, | Performed by: PHYSICIAN ASSISTANT

## 2019-01-25 PROCEDURE — 3008F PR BODY MASS INDEX (BMI) DOCUMENTED: ICD-10-PCS | Mod: CPTII,S$GLB,, | Performed by: PHYSICIAN ASSISTANT

## 2019-01-25 RX ORDER — PROPRANOLOL HYDROCHLORIDE 60 MG/1
60 CAPSULE, EXTENDED RELEASE ORAL DAILY
Qty: 30 CAPSULE | Refills: 1 | Status: SHIPPED | OUTPATIENT
Start: 2019-01-25 | End: 2019-02-27

## 2019-01-25 NOTE — PATIENT INSTRUCTIONS
PLEASE CALL OR RETURN TO CLINIC IF YOU HAVE ANY QUESTIONS OR CONCERNS.     PLEASE REMEMBER TO CALL TO SET UP APPOINTMENT IN PSYCHIATRY FOR YOUR ANXIETY.

## 2019-01-25 NOTE — PROGRESS NOTES
"Subjective:       Patient ID: Ruby Angela is a 58 y.o. female.    Chief Complaint: Migraine; Sinus Problem; and Chills    HPI     C/o frequent migraines/headaches, prev on Fioricet by her PCP in [place]. Pt from [place] here in [place] with her  who recently had 2nd liver transplant here at [facility]. At last visit with me trial of Imitrex initiated, which helps but still occurring weekly. Character of migraines unchanged, frontal with associate photophobia. Recent triggers is stress related with concern for her husbands health.    C/o mild runny nose. No fevers, cough, or sore throat.     C/o anxiety, no longer taking paxil or lexapro that was initiated by previous providers and psychiatry.     Review of patient's allergies indicates:   Allergen Reactions    Naproxen Hives and Itching     Past Medical History:   Diagnosis Date    Anxiety     Migraines            Review of Systems   Constitutional: Negative for chills, fever and unexpected weight change.   Eyes: Negative for visual disturbance.   Respiratory: Negative for cough, shortness of breath and wheezing.    Cardiovascular: Negative for chest pain and leg swelling.   Gastrointestinal: Negative for abdominal pain, nausea and vomiting.   Endocrine: Negative for polydipsia, polyphagia and polyuria.   Skin: Negative for rash.   Neurological: Negative for weakness, light-headedness and headaches.       Objective: /88   Pulse 100   Temp 98 °F (36.7 °C)   Ht 5' 4" (1.626 m)   Wt 59.8 kg (131 lb 13.4 oz)   SpO2 99%   BMI 22.63 kg/m²         Physical Exam   Constitutional: She is oriented to person, place, and time. She appears well-developed and well-nourished. No distress.   HENT:   Head: Normocephalic and atraumatic.   Mouth/Throat: Oropharynx is clear and moist.   Eyes: Pupils are equal, round, and reactive to light.   Cardiovascular: Normal rate and regular rhythm. Exam reveals no friction rub.   No murmur heard.  Pulmonary/Chest: Effort " normal and breath sounds normal. She has no wheezes. She has no rales.   Abdominal: Soft. Bowel sounds are normal. There is no tenderness.   Neurological: She is alert and oriented to person, place, and time.   Skin: Skin is warm and dry. Capillary refill takes less than 2 seconds. No rash noted.   Psychiatric: She has a normal mood and affect.   Vitals reviewed.      Assessment:       1. Anxiety    2. Chronic migraine    3. Acute rhinitis        Plan:           Ruby was seen today for migraine, sinus problem and chills.    Diagnoses and all orders for this visit:    Anxiety  -     Ambulatory Referral to Psychiatry    Chronic migraine   -Continue Imitrex prn for abortive measures   -Trial of propranolol for preventive measures   -Advised to monitor BP   -RTC in 3 weeks for med eval  -     propranolol (INDERAL LA) 60 MG 24 hr capsule; Take 1 capsule (60 mg total) by mouth once daily. For prevention of headaches/migraines    Acute rhinitis  -     sodium chloride (OCEAN NASAL) 0.65 % nasal spray; 1 spray by Nasal route as needed for Congestion.      Luisa Riley PA-C

## 2019-02-04 ENCOUNTER — HOSPITAL ENCOUNTER (EMERGENCY)
Facility: HOSPITAL | Age: 59
Discharge: HOME | End: 2019-02-04
Attending: EMERGENCY MEDICINE
Payer: COMMERCIAL

## 2019-02-04 VITALS
WEIGHT: 130 LBS | SYSTOLIC BLOOD PRESSURE: 107 MMHG | DIASTOLIC BLOOD PRESSURE: 65 MMHG | RESPIRATION RATE: 15 BRPM | OXYGEN SATURATION: 98 % | HEIGHT: 65 IN | TEMPERATURE: 98 F | HEART RATE: 65 BPM | BODY MASS INDEX: 21.66 KG/M2

## 2019-02-04 DIAGNOSIS — R42 LIGHTHEADEDNESS: Primary | ICD-10-CM

## 2019-02-04 DIAGNOSIS — F43.22 ADJUSTMENT DISORDER WITH ANXIETY: ICD-10-CM

## 2019-02-04 DIAGNOSIS — R55 SYNCOPE, UNSPECIFIED SYNCOPE TYPE: ICD-10-CM

## 2019-02-04 LAB
ALBUMIN SERPL BCP-MCNC: 3.5 G/DL
ALP SERPL-CCNC: 86 U/L
ALT SERPL W/O P-5'-P-CCNC: 55 U/L
ANION GAP SERPL CALC-SCNC: 10 MMOL/L
AST SERPL-CCNC: 37 U/L
BACTERIA #/AREA URNS AUTO: ABNORMAL /HPF
BASOPHILS # BLD AUTO: 0.09 K/UL
BASOPHILS NFR BLD: 1.3 %
BILIRUB SERPL-MCNC: 0.6 MG/DL
BILIRUB UR QL STRIP: NEGATIVE
BUN SERPL-MCNC: 20 MG/DL
CALCIUM SERPL-MCNC: 9.9 MG/DL
CHLORIDE SERPL-SCNC: 103 MMOL/L
CLARITY UR REFRACT.AUTO: ABNORMAL
CO2 SERPL-SCNC: 24 MMOL/L
COLOR UR AUTO: ABNORMAL
CREAT SERPL-MCNC: 0.8 MG/DL
DIFFERENTIAL METHOD: ABNORMAL
EOSINOPHIL # BLD AUTO: 0.3 K/UL
EOSINOPHIL NFR BLD: 4.1 %
ERYTHROCYTE [DISTWIDTH] IN BLOOD BY AUTOMATED COUNT: 12.3 %
EST. GFR  (AFRICAN AMERICAN): >60 ML/MIN/1.73 M^2
EST. GFR  (NON AFRICAN AMERICAN): >60 ML/MIN/1.73 M^2
GLUCOSE SERPL-MCNC: 149 MG/DL
GLUCOSE UR QL STRIP: NEGATIVE
HCT VFR BLD AUTO: 46.3 %
HGB BLD-MCNC: 15.3 G/DL
HGB UR QL STRIP: NEGATIVE
HYALINE CASTS UR QL AUTO: 9 /LPF
IMM GRANULOCYTES # BLD AUTO: 0.05 K/UL
IMM GRANULOCYTES NFR BLD AUTO: 0.7 %
KETONES UR QL STRIP: NEGATIVE
LEUKOCYTE ESTERASE UR QL STRIP: ABNORMAL
LYMPHOCYTES # BLD AUTO: 1.9 K/UL
LYMPHOCYTES NFR BLD: 27.3 %
MCH RBC QN AUTO: 28.5 PG
MCHC RBC AUTO-ENTMCNC: 33 G/DL
MCV RBC AUTO: 86 FL
MICROSCOPIC COMMENT: ABNORMAL
MONOCYTES # BLD AUTO: 0.4 K/UL
MONOCYTES NFR BLD: 5.5 %
NEUTROPHILS # BLD AUTO: 4.2 K/UL
NEUTROPHILS NFR BLD: 61.1 %
NITRITE UR QL STRIP: NEGATIVE
NRBC BLD-RTO: 0 /100 WBC
PH UR STRIP: 6 [PH] (ref 5–8)
PLATELET # BLD AUTO: 390 K/UL
PMV BLD AUTO: 8.8 FL
POCT GLUCOSE: 164 MG/DL (ref 70–110)
POTASSIUM SERPL-SCNC: 4.3 MMOL/L
PROT SERPL-MCNC: 7.4 G/DL
PROT UR QL STRIP: NEGATIVE
RBC # BLD AUTO: 5.36 M/UL
RBC #/AREA URNS AUTO: 3 /HPF (ref 0–4)
SODIUM SERPL-SCNC: 137 MMOL/L
SP GR UR STRIP: 1.02 (ref 1–1.03)
SQUAMOUS #/AREA URNS AUTO: 7 /HPF
TROPONIN I SERPL DL<=0.01 NG/ML-MCNC: <0.006 NG/ML
TROPONIN I SERPL DL<=0.01 NG/ML-MCNC: <0.006 NG/ML
TSH SERPL DL<=0.005 MIU/L-ACNC: 3.75 UIU/ML
URN SPEC COLLECT METH UR: ABNORMAL
WBC # BLD AUTO: 6.85 K/UL
WBC #/AREA URNS AUTO: 3 /HPF (ref 0–5)

## 2019-02-04 PROCEDURE — 25000003 PHARM REV CODE 250: Performed by: PHYSICIAN ASSISTANT

## 2019-02-04 PROCEDURE — 93010 EKG 12-LEAD: ICD-10-PCS | Mod: ,,, | Performed by: INTERNAL MEDICINE

## 2019-02-04 PROCEDURE — 81001 URINALYSIS AUTO W/SCOPE: CPT

## 2019-02-04 PROCEDURE — 96361 HYDRATE IV INFUSION ADD-ON: CPT

## 2019-02-04 PROCEDURE — 80053 COMPREHEN METABOLIC PANEL: CPT

## 2019-02-04 PROCEDURE — 99284 EMERGENCY DEPT VISIT MOD MDM: CPT | Mod: 25

## 2019-02-04 PROCEDURE — 84484 ASSAY OF TROPONIN QUANT: CPT

## 2019-02-04 PROCEDURE — 99285 EMERGENCY DEPT VISIT HI MDM: CPT | Mod: ,,, | Performed by: EMERGENCY MEDICINE

## 2019-02-04 PROCEDURE — 93005 ELECTROCARDIOGRAM TRACING: CPT

## 2019-02-04 PROCEDURE — 96360 HYDRATION IV INFUSION INIT: CPT

## 2019-02-04 PROCEDURE — 93010 ELECTROCARDIOGRAM REPORT: CPT | Mod: ,,, | Performed by: INTERNAL MEDICINE

## 2019-02-04 PROCEDURE — 85025 COMPLETE CBC W/AUTO DIFF WBC: CPT

## 2019-02-04 PROCEDURE — 99285 PR EMERGENCY DEPT VISIT,LEVEL V: ICD-10-PCS | Mod: ,,, | Performed by: EMERGENCY MEDICINE

## 2019-02-04 PROCEDURE — 84443 ASSAY THYROID STIM HORMONE: CPT

## 2019-02-04 RX ORDER — PAROXETINE 10 MG/1
10 TABLET, FILM COATED ORAL EVERY MORNING
Qty: 30 TABLET | Refills: 0 | OUTPATIENT
Start: 2019-02-04 | End: 2019-03-06

## 2019-02-04 RX ORDER — BUTALBITAL, ACETAMINOPHEN AND CAFFEINE 50; 325; 40 MG/1; MG/1; MG/1
1 TABLET ORAL EVERY 4 HOURS PRN
Qty: 10 TABLET | Refills: 0 | Status: SHIPPED | OUTPATIENT
Start: 2019-02-04 | End: 2019-03-06

## 2019-02-04 RX ADMIN — SODIUM CHLORIDE 1000 ML: 0.9 INJECTION, SOLUTION INTRAVENOUS at 09:02

## 2019-02-04 NOTE — ED PROVIDER NOTES
CC: Loss of Consciousness (passsed out and hit head twice at bus stop,  pt upstairs)      HPI: Ruby Angela is a 58 y.o. year old female who presents to the ED complaining of syncope    She was standing outside smoking when she felt lightheaded, no pain prior to the LOC, she fell hit the back of the head no LOC  No vertigo. No ha/cp/sob/abd pain/back pain, + she started sweating at the time, no palpitations, no n/v  + she ate breakfast this morning  No paresthesia, no weakness/numbness  On paxil for the last 2 months, she feels lightheaded in am if she takes a full tab at night  No hx of cardiac disease  + smoker, no IVDA, Fam hx of heart disease  No recent travel/immobilization, no hx DVT/PE, no LE edema/calf pain    Hx of hypoglycemia approx 30 years ago    Past Medical History:   Diagnosis Date    Anxiety     Migraines      Past Surgical History:   Procedure Laterality Date    HYSTERECTOMY       No family history on file.  No current facility-administered medications on file prior to encounter.      Current Outpatient Medications on File Prior to Encounter   Medication Sig Dispense Refill    butalbital-acetaminophen-caff -40 mg Cap Take 1 capsule by mouth once daily as needed for headache 20 capsule 0    sumatriptan (IMITREX) 50 MG tablet Take 1 tablet (50 mg total) by mouth daily as needed for Migraine. May take additional dose after 2 hrs. Do not exceed 100mg in 24hrs. 12 tablet 0    clonazePAM (KLONOPIN) 1 MG tablet Take 0.5 tablets (0.5 mg total) by mouth 2 (two) times daily as needed for Anxiety. 50 tablet 0    propranolol (INDERAL LA) 60 MG 24 hr capsule Take 1 capsule (60 mg total) by mouth once daily. For prevention of headaches/migraines 30 capsule 1    sodium chloride (OCEAN NASAL) 0.65 % nasal spray 1 spray by Nasal route as needed for Congestion. 15 mL 1     Naproxen  Social History     Socioeconomic History    Marital status:      Spouse name: None    Number of children:  None    Years of education: None    Highest education level: None   Social Needs    Financial resource strain: None    Food insecurity - worry: None    Food insecurity - inability: None    Transportation needs - medical: None    Transportation needs - non-medical: None   Occupational History    None   Tobacco Use    Smoking status: Current Every Day Smoker     Packs/day: 0.50     Types: Cigarettes    Smokeless tobacco: Never Used   Substance and Sexual Activity    Alcohol use: No     Frequency: Never    Drug use: No    Sexual activity: None   Other Topics Concern    None   Social History Narrative    None       ROS:     Constitutional : neg  HEENT neg  Resp neg  Cardiac    GI neg   neg  Neuro neg  Heme/Immune: neg  Endo neg  Skin neg    PHYSICAL EXAM:  Vitals:    02/04/19 1002   BP: (!) 108/58   Pulse: 68   Resp: 13   Temp:          PHYSICAL EXAM:   general: comfortable, in no acute distress  VS: triage VS reviewed  HEENT: NC/AT, no post occipital ttp/hematoma, PERRL, EOMI  MSK: no CTL midline ttp  Neck: trachea midline, full rom  CV: RRR, no m/r/g, no LE pitting edema  Resp: CTAB  ABD:  ND, + normal BS, NT  Renal: No CVAT  Neuro: AAO x 3, 5/5 muscle strength in upper and lower extremities, sensation grossly intact, face symmetric, speech normal  Ext: no deformity, +2 symmetrical peripheral pulses          DATA & INTERVENTIONS:    LABS reviewed:  Labs Reviewed   CBC W/ AUTO DIFFERENTIAL - Abnormal; Notable for the following components:       Result Value    Platelets 390 (*)     MPV 8.8 (*)     Immature Granulocytes 0.7 (*)     Immature Grans (Abs) 0.05 (*)     All other components within normal limits   COMPREHENSIVE METABOLIC PANEL - Abnormal; Notable for the following components:    Glucose 149 (*)     ALT 55 (*)     All other components within normal limits   URINALYSIS, REFLEX TO URINE CULTURE - Abnormal; Notable for the following components:    Appearance, UA Hazy (*)     Leukocytes, UA Trace  (*)     All other components within normal limits    Narrative:     Preferred Collection Type->Urine, Clean Catch   URINALYSIS MICROSCOPIC - Abnormal; Notable for the following components:    Bacteria, UA Few (*)     Hyaline Casts, UA 9 (*)     All other components within normal limits    Narrative:     Preferred Collection Type->Urine, Clean Catch   POCT GLUCOSE - Abnormal; Notable for the following components:    POCT Glucose 164 (*)     All other components within normal limits   TROPONIN I   TSH   TROPONIN I       RADIOLOGY reviewed:  Imaging Results          CT Head Without Contrast (Final result)  Result time 02/04/19 09:30:59    Final result by Heriberto Harp DO (02/04/19 09:30:59)                 Impression:      Unremarkable motion limited noncontrast CT head specifically without evidence for acute intracranial hemorrhage.  Clinical correlation and further evaluation as warranted.      Electronically signed by: Heriberto Harp DO  Date:    02/04/2019  Time:    09:30             Narrative:    EXAMINATION:  CT HEAD WITHOUT CONTRAST    CLINICAL HISTORY:  Syncope/fainting;    TECHNIQUE:  Multiple sequential 5 mm axial images of the head without contrast.  Coronal and sagittal reformatted imaging from the axial acquisition.    COMPARISON:  None    FINDINGS:  Study is limited by patient motion, within limits of the study there is no evidence for acute intracranial hemorrhage or sulcal effacement.  The ventricles are normal in size without hydrocephalus.  There is no midline shift or mass effect.  Visualized paranasal sinuses and mastoid air cells are clear.                                MEDICATIONS/FLUIDS:  Medications   sodium chloride 0.9% bolus 1,000 mL (1,000 mLs Intravenous New Bag 2/4/19 9454)         MDM: ***    Chart reviewed: ***  Consults:***    IMPRESSION:  1.) ***  2.) ***    Dispo: Discharge***  Admission***  Observation***    Critical Care Time: N/A***

## 2019-02-04 NOTE — ED PROVIDER NOTES
Encounter Date: 2/4/2019       History     Chief Complaint   Patient presents with    Loss of Consciousness     passsed out and hit head twice at bus stop,  pt upstairs     59 y/o WF with history of migraines and anxiety presents to the ED c/o lightheadedness and fall just prior to arrival. Her  is currently admitted here after having a transplant - she has been sleeping in the hospital room with him. She went outside to smoke this morning and while smoking became very lightheaded, diaphoretic and fall backwards striking her posterior head on the concrete. She does not think that she had LOC but reports bystanders told her she passed out. She is not on any blood thinners. She reports associated diaphoresis. She has been taking OTC zyrtec and sudafed for URI symptoms. Denies fever, chills, chest pain, SOB, abdominal pain, headache, changes in vision, numbness, weakness.       The history is provided by the patient.     Review of patient's allergies indicates:   Allergen Reactions    Naproxen Hives and Itching     Past Medical History:   Diagnosis Date    Anxiety     Migraines      Past Surgical History:   Procedure Laterality Date    HYSTERECTOMY       No family history on file.  Social History     Tobacco Use    Smoking status: Current Every Day Smoker     Packs/day: 0.50     Types: Cigarettes    Smokeless tobacco: Never Used   Substance Use Topics    Alcohol use: No     Frequency: Never    Drug use: No     Review of Systems   Constitutional: Positive for diaphoresis. Negative for chills and fever.   HENT: Negative for congestion, rhinorrhea and sore throat.    Eyes: Negative for photophobia and visual disturbance.   Respiratory: Negative for shortness of breath.    Cardiovascular: Negative for chest pain.   Gastrointestinal: Negative for abdominal pain, constipation, diarrhea, nausea and vomiting.   Genitourinary: Negative for dysuria and hematuria.   Musculoskeletal: Negative for back pain,  myalgias, neck pain and neck stiffness.   Skin: Negative for rash and wound.   Neurological: Positive for syncope and light-headedness. Negative for dizziness, seizures, weakness, numbness and headaches.   Psychiatric/Behavioral: Negative for confusion.       Physical Exam     Initial Vitals [02/04/19 0810]   BP Pulse Resp Temp SpO2   (!) 130/58 84 18 97.8 °F (36.6 °C) 96 %      MAP       --         Physical Exam    Nursing note and vitals reviewed.  Constitutional: She appears well-developed and well-nourished. She is not diaphoretic. No distress.   HENT:   Head: Normocephalic and atraumatic. Head is without raccoon's eyes, without Gayle's sign and without contusion.   No scalp contusion or lacerations appreciated.   Neck: Normal range of motion. Neck supple.   Cardiovascular: Normal rate, regular rhythm and normal heart sounds. Exam reveals no gallop and no friction rub.    No murmur heard.  Pulmonary/Chest: Breath sounds normal. She has no wheezes. She has no rhonchi. She has no rales.   Abdominal: Soft. Bowel sounds are normal. There is no tenderness. There is no rebound and no guarding.   Musculoskeletal: Normal range of motion.   No bony tenderness to bilateral hips. No midline C, T, or L spine tenderness.    Neurological: She is alert and oriented to person, place, and time.   Skin: Skin is warm and dry. No rash noted. No erythema.   Psychiatric: She has a normal mood and affect.         ED Course   Procedures  Labs Reviewed   CBC W/ AUTO DIFFERENTIAL - Abnormal; Notable for the following components:       Result Value    Platelets 390 (*)     MPV 8.8 (*)     Immature Granulocytes 0.7 (*)     Immature Grans (Abs) 0.05 (*)     All other components within normal limits   COMPREHENSIVE METABOLIC PANEL - Abnormal; Notable for the following components:    Glucose 149 (*)     ALT 55 (*)     All other components within normal limits   URINALYSIS, REFLEX TO URINE CULTURE - Abnormal; Notable for the following  components:    Appearance, UA Hazy (*)     Leukocytes, UA Trace (*)     All other components within normal limits    Narrative:     Preferred Collection Type->Urine, Clean Catch   URINALYSIS MICROSCOPIC - Abnormal; Notable for the following components:    Bacteria, UA Few (*)     Hyaline Casts, UA 9 (*)     All other components within normal limits    Narrative:     Preferred Collection Type->Urine, Clean Catch   POCT GLUCOSE - Abnormal; Notable for the following components:    POCT Glucose 164 (*)     All other components within normal limits   TROPONIN I   TSH   TROPONIN I        ECG Results          EKG 12-lead (In process)  Result time 02/04/19 08:55:36    In process by Interface, Lab In [facility] (02/04/19 08:55:36)                 Narrative:    Test Reason : (Not Selected)    Vent. Rate : 087 BPM     Atrial Rate : 087 BPM     P-R Int : 160 ms          QRS Dur : 074 ms      QT Int : 374 ms       P-R-T Axes : 077 084 062 degrees     QTc Int : 450 ms    Normal sinus rhythm  Normal ECG  No previous ECGs available    Referred By: System System           Confirmed By:                             Imaging Results          CT Head Without Contrast (Final result)  Result time 02/04/19 09:30:59    Final result by Heriberto Harp DO (02/04/19 09:30:59)                 Impression:      Unremarkable motion limited noncontrast CT head specifically without evidence for acute intracranial hemorrhage.  Clinical correlation and further evaluation as warranted.      Electronically signed by: Heriberto Harp DO  Date:    02/04/2019  Time:    09:30             Narrative:    EXAMINATION:  CT HEAD WITHOUT CONTRAST    CLINICAL HISTORY:  Syncope/fainting;    TECHNIQUE:  Multiple sequential 5 mm axial images of the head without contrast.  Coronal and sagittal reformatted imaging from the axial acquisition.    COMPARISON:  None    FINDINGS:  Study is limited by patient motion, within limits of the study there is no evidence for acute  intracranial hemorrhage or sulcal effacement.  The ventricles are normal in size without hydrocephalus.  There is no midline shift or mass effect.  Visualized paranasal sinuses and mastoid air cells are clear.                                       APC / Resident Notes:   59 y/o WF with history of migraines and anxiety presents to the ED c/o lightheadedness and fall just prior to arrival.VSS. Not orthostatic. RRR. Lungs clear. Abdomen soft. Neuro intact without any focal neuro deficits. No signs of head trauma. No midline C, T, or L spine tenderness. No bony tenderness to bilateral hips. DDx includes but is not limited to hypoglycemia, anemia, UTI, ICH, hypo/hyperthyroidism, electrolyte abnormality, ACS. Will get labs, CT head.    EKG shows NSR.    UA with no infection. No leukocytosis or anemia. CMP with no acute abnormality. Troponin normal. TSH normal.    CT Head without evidence for ICH.     Repeat troponin WNL.     I do not feel that she needs any further labs or imaging at this time. Stable for discharge.    She will follow up with her PCP.  All of the patient's questions were answered.  I reviewed the patient's chart, labs, and imaging and discussed the case with my supervising physician.              Attending Attestation:     Physician Attestation Statement for NP/PA:   I have conducted a face to face encounter with this patient in addition to the NP/PA, due to Medical Complexity    Other NP/PA Attestation Additions:    History of Present Illness:   HPI: Ruby Angela is a 58 y.o. year old female who presents to the ED complaining of syncope    She was standing outside smoking when she felt lightheaded, no pain prior to the LOC, she fell hit the back of the head no LOC  No vertigo. No ha/cp/sob/abd pain/back pain, + she started sweating at the time, no palpitations, no n/v  + she ate breakfast this morning  No paresthesia, no weakness/numbness  On paxil for the last 2 months, she feels lightheaded in am if  she takes a full tab at night  No hx of cardiac disease  + smoker, no IVDA, Fam hx of heart disease  No recent travel/immobilization, no hx DVT/PE, no LE edema/calf pain    Hx of hypoglycemia approx 30 years ago    Past Medical History:  Diagnosis Date   Anxiety    Migraines     Past Surgical History:  Procedure Laterality Date   HYSTERECTOMY      No family history on file.  No current facility-administered medications on file prior to encounter.     Current Outpatient Medications on File Prior to Encounter  Medication Sig Dispense Refill   butalbital-acetaminophen-caff -40 mg Cap Take 1 capsule by mouth once daily as needed for headache 20 capsule 0   sumatriptan (IMITREX) 50 MG tablet Take 1 tablet (50 mg total) by mouth daily as needed for Migraine. May take additional dose after 2 hrs. Do not exceed 100mg in 24hrs. 12 tablet 0   clonazePAM (KLONOPIN) 1 MG tablet Take 0.5 tablets (0.5 mg total) by mouth 2 (two) times daily as needed for Anxiety. 50 tablet 0   propranolol (INDERAL LA) 60 MG 24 hr capsule Take 1 capsule (60 mg total) by mouth once daily. For prevention of headaches/migraines 30 capsule 1   sodium chloride (OCEAN NASAL) 0.65 % nasal spray 1 spray by Nasal route as needed for Congestion. 15 mL 1    Naproxen  Social History    Socioeconomic History   Marital status:     Spouse name: None   Number of children: None   Years of education: None   Highest education level: None  Social Needs   Financial resource strain: None   Food insecurity - worry: None   Food insecurity - inability: None   Transportation needs - medical: None   Transportation needs - non-medical: None  Occupational History   None  Tobacco Use   Smoking status: Current Every Day Smoker    Packs/day: 0.50    Types: Cigarettes   Smokeless tobacco: Never Used  Substance and Sexual Activity   Alcohol use: No    Frequency: Never   Drug use: No   Sexual activity: None  Other Topics Concern   None  Social  History Narrative   None      ROS:     Constitutional : neg  HEENT neg  Resp neg  Cardiac    GI neg   neg  Neuro neg  Heme/Immune: neg  Endo neg  Skin neg    PHYSICAL EXAM:  Vitals:   19 1002  BP: (!) 108/58  Pulse: 68  Resp: 13  Temp:        Physical Exam:     PHYSICAL EXAM:   general: comfortable, in no acute distress  VS: triage VS reviewed  HEENT: NC/AT, no post occipital ttp/hematoma, PERRL, EOMI  MSK: no CTL midline ttp  Neck: trachea midline, full rom  CV: RRR, no m/r/g, no LE pitting edema  Resp: CTAB  ABD:  ND, + normal BS, NT  Renal: No CVAT  Neuro: AAO x 3, 5/5 muscle strength in upper and lower extremities, sensation grossly intact, face symmetric, speech normal  Ext: no deformity, +2 symmetrical peripheral pulses     Medical Decision Makin yo F w/ near syncope, preceded by lightheadedness and diaphoresis.  DDX: acs vs palpitations vs vasovagal vs hypoglycemia  No cp/sob, no hx of ACS,+ smoker, no F hx of ACS/CVA  No headache/weakness/numbness/n/v to suggest SAH  unlikely DVT/PE/AAA/Ao dissection    ekg NSR, symmetrically peaked T waves, no prior for comparison  Trop x1 neg  CT h no acute    Will rpt trop r/o ACS, if neg to f/u w/ PCP                      Clinical Impression:   The primary encounter diagnosis was Lightheadedness. A diagnosis of Syncope, unspecified syncope type was also pertinent to this visit.      Disposition:   Disposition: Discharged  Condition: Stable                        Rachel Bsasett PA-C  [phone]

## 2019-02-04 NOTE — ED TRIAGE NOTES
Pt presents to ed with a syncope episode. Pt fell and hit the back of her head after smoking a cigarette. Pt reports she has been having a sinus drip for weeks and taking zyrtec. No cardiac hx.

## 2019-02-11 DIAGNOSIS — F43.22 ADJUSTMENT DISORDER WITH ANXIETY: ICD-10-CM

## 2019-02-11 RX ORDER — PAROXETINE 10 MG/1
10 TABLET, FILM COATED ORAL EVERY MORNING
Qty: 30 TABLET | Refills: 0 | Status: SHIPPED | OUTPATIENT
Start: 2019-02-11 | End: 2019-08-19 | Stop reason: SDUPTHER

## 2019-02-28 RX ORDER — PROPRANOLOL HYDROCHLORIDE 60 MG/1
60 CAPSULE, EXTENDED RELEASE ORAL DAILY
Qty: 30 CAPSULE | Refills: 1 | Status: SHIPPED | OUTPATIENT
Start: 2019-02-28 | End: 2019-08-19 | Stop reason: SDUPTHER

## 2019-08-19 ENCOUNTER — OFFICE VISIT (OUTPATIENT)
Dept: INTERNAL MEDICINE | Facility: CLINIC | Age: 59
End: 2019-08-19
Payer: COMMERCIAL

## 2019-08-19 VITALS
OXYGEN SATURATION: 99 % | DIASTOLIC BLOOD PRESSURE: 74 MMHG | HEART RATE: 89 BPM | WEIGHT: 130.5 LBS | SYSTOLIC BLOOD PRESSURE: 130 MMHG | BODY MASS INDEX: 21.72 KG/M2

## 2019-08-19 DIAGNOSIS — F42.4 SKIN PICKING HABIT: ICD-10-CM

## 2019-08-19 DIAGNOSIS — F43.22 ADJUSTMENT DISORDER WITH ANXIETY: ICD-10-CM

## 2019-08-19 DIAGNOSIS — K08.89 PAIN, DENTAL: Primary | ICD-10-CM

## 2019-08-19 DIAGNOSIS — L29.9 ITCHING: ICD-10-CM

## 2019-08-19 PROCEDURE — 3008F BODY MASS INDEX DOCD: CPT | Mod: CPTII,S$GLB,, | Performed by: PHYSICIAN ASSISTANT

## 2019-08-19 PROCEDURE — 99214 OFFICE O/P EST MOD 30 MIN: CPT | Mod: S$GLB,,, | Performed by: PHYSICIAN ASSISTANT

## 2019-08-19 PROCEDURE — 3008F PR BODY MASS INDEX (BMI) DOCUMENTED: ICD-10-PCS | Mod: CPTII,S$GLB,, | Performed by: PHYSICIAN ASSISTANT

## 2019-08-19 PROCEDURE — 99214 PR OFFICE/OUTPT VISIT, EST, LEVL IV, 30-39 MIN: ICD-10-PCS | Mod: S$GLB,,, | Performed by: PHYSICIAN ASSISTANT

## 2019-08-19 PROCEDURE — 99999 PR PBB SHADOW E&M-EST. PATIENT-LVL IV: ICD-10-PCS | Mod: PBBFAC,,, | Performed by: PHYSICIAN ASSISTANT

## 2019-08-19 PROCEDURE — 99999 PR PBB SHADOW E&M-EST. PATIENT-LVL IV: CPT | Mod: PBBFAC,,, | Performed by: PHYSICIAN ASSISTANT

## 2019-08-19 RX ORDER — HYDROXYZINE HYDROCHLORIDE 25 MG/1
25 TABLET, FILM COATED ORAL 3 TIMES DAILY PRN
Qty: 30 TABLET | Refills: 0 | Status: SHIPPED | OUTPATIENT
Start: 2019-08-19

## 2019-08-19 RX ORDER — RIZATRIPTAN BENZOATE 5 MG/1
5 TABLET ORAL
Qty: 10 TABLET | Refills: 1 | Status: SHIPPED | OUTPATIENT
Start: 2019-08-19 | End: 2019-09-18

## 2019-08-19 RX ORDER — PROPRANOLOL HYDROCHLORIDE 60 MG/1
60 CAPSULE, EXTENDED RELEASE ORAL DAILY
Qty: 30 CAPSULE | Refills: 1 | OUTPATIENT
Start: 2019-08-19 | End: 2019-08-19 | Stop reason: SDUPTHER

## 2019-08-19 RX ORDER — RIZATRIPTAN BENZOATE 5 MG/1
5 TABLET ORAL
Qty: 10 TABLET | Refills: 1 | OUTPATIENT
Start: 2019-08-19 | End: 2019-08-19 | Stop reason: SDUPTHER

## 2019-08-19 RX ORDER — PAROXETINE 10 MG/1
10 TABLET, FILM COATED ORAL EVERY MORNING
Qty: 30 TABLET | Refills: 2 | Status: SHIPPED | OUTPATIENT
Start: 2019-08-19 | End: 2019-09-18

## 2019-08-19 RX ORDER — PAROXETINE 10 MG/1
10 TABLET, FILM COATED ORAL EVERY MORNING
Qty: 30 TABLET | Refills: 2 | OUTPATIENT
Start: 2019-08-19 | End: 2019-08-19 | Stop reason: SDUPTHER

## 2019-08-19 RX ORDER — LIDOCAINE HYDROCHLORIDE 20 MG/ML
SOLUTION OROPHARYNGEAL EVERY 6 HOURS
Qty: 100 ML | Refills: 0 | Status: SHIPPED | OUTPATIENT
Start: 2019-08-19

## 2019-08-19 RX ORDER — HYDROXYZINE HYDROCHLORIDE 25 MG/1
25 TABLET, FILM COATED ORAL 3 TIMES DAILY PRN
Qty: 30 TABLET | Refills: 0 | OUTPATIENT
Start: 2019-08-19 | End: 2019-08-19 | Stop reason: SDUPTHER

## 2019-08-19 RX ORDER — PROPRANOLOL HYDROCHLORIDE 60 MG/1
60 CAPSULE, EXTENDED RELEASE ORAL DAILY
Qty: 30 CAPSULE | Refills: 1 | Status: SHIPPED | OUTPATIENT
Start: 2019-08-19 | End: 2020-08-18

## 2019-08-19 RX ORDER — LIDOCAINE HYDROCHLORIDE 20 MG/ML
SOLUTION OROPHARYNGEAL EVERY 6 HOURS
Qty: 100 ML | Refills: 0 | OUTPATIENT
Start: 2019-08-19 | End: 2019-08-19 | Stop reason: SDUPTHER

## 2019-08-19 NOTE — PROGRESS NOTES
Subjective:       Patient ID: Ruby Angela is a 59 y.o. female.    Chief Complaint: Dental Pain; Migraine; and Medication Refill    HPI     Pt from [place] here in [place] with her  who follows with [facility].   PCP in [place].     Here for med refills. She requested printed Rx as she plans to feel back home in [place].    Chronic Migraines: Reports improvement infrequency with propanolol. Wants to try alternative meds to imitrex. Prev took Fioricet. Character of migraines unchanged, frontal with associate photophobia. Recent triggers is stress related with concern for her husbands health    Anxiety: Taking half tablet of Paxil 10mg, Seen by Psychiatry but has not followed up. Reports increased anxiousness as her  has to undergo another medical procedure. She states she has been picking her skin.       Review of patient's allergies indicates:   Allergen Reactions    Naproxen Hives and Itching     Past Medical History:   Diagnosis Date    Anxiety     Migraines      Social History     Tobacco Use    Smoking status: Current Every Day Smoker     Packs/day: 0.50     Types: Cigarettes    Smokeless tobacco: Never Used   Substance Use Topics    Alcohol use: No     Frequency: Never    Drug use: No         Review of Systems   Constitutional: Negative for chills, fever and unexpected weight change.   HENT: Positive for dental problem.    Respiratory: Negative for cough and shortness of breath.    Cardiovascular: Negative for chest pain and leg swelling.   Gastrointestinal: Negative for abdominal pain, nausea and vomiting.   Skin: Negative for rash.        Frequent skin picking   Neurological: Positive for headaches. Negative for weakness.   Psychiatric/Behavioral: The patient is nervous/anxious.        Objective: /74   Pulse 89   Wt 59.2 kg (130 lb 8.2 oz)   SpO2 99%   BMI 21.72 kg/m²         Physical Exam   Constitutional: She appears well-developed and well-nourished. No  distress.   HENT:   Head: Normocephalic and atraumatic.   Mouth/Throat: Oropharynx is clear and moist. Abnormal dentition. Dental caries present. No dental abscesses.   Cardiovascular: Normal rate and regular rhythm. Exam reveals no friction rub.   No murmur heard.  Pulmonary/Chest: Effort normal and breath sounds normal. She has no wheezes. She has no rales.   Abdominal: Soft. Bowel sounds are normal. There is no tenderness.   Neurological: She is alert.   Skin: Skin is warm and dry. Capillary refill takes less than 2 seconds. No rash noted.        Few scattered excoriations to lower legs and upper back   Psychiatric: She has a normal mood and affect.   Vitals reviewed.      Assessment:       1. Pain, dental    2. Chronic migraine    3. Adjustment disorder with anxiety    4. Itching    5. Skin picking habit        Plan:         Ruby was seen today for dental pain, migraine and medication refill.    Diagnoses and all orders for this visit:    Pain, dental  Advised follow up with her dentist  -     lidocaine HCl 2% (XYLOCAINE) 2 % Soln; by Mucous Membrane route every 6 (six) hours.    Chronic migraine  -     Ambulatory Referral to Neurology  -     rizatriptan (MAXALT) 5 MG tablet; Take 1 tablet (5 mg total) by mouth as needed for Migraine.  -     propranolol (INDERAL LA) 60 MG 24 hr capsule; Take 1 capsule (60 mg total) by mouth once daily. For prevention of headaches/migraines    Adjustment disorder with anxiety  Skin picking habit  -     Ambulatory Referral to Psychiatry  -     paroxetine (PAXIL) 10 MG tablet; Take 1 tablet (10 mg total) by mouth every morning.  -     hydrOXYzine HCl (ATARAX) 25 MG tablet; Take 1 tablet (25 mg total) by mouth 3 (three) times daily as needed for Itching or Anxiety.    Itching  -     hydrOXYzine HCl (ATARAX) 25 MG tablet; Take 1 tablet (25 mg total) by mouth 3 (three) times daily as needed for Itching or Anxiety.        Luisa Riley PA-C

## 2019-09-09 ENCOUNTER — OFFICE VISIT (OUTPATIENT)
Dept: INTERNAL MEDICINE | Facility: CLINIC | Age: 59
End: 2019-09-09
Payer: COMMERCIAL

## 2019-09-09 VITALS
HEIGHT: 64 IN | HEART RATE: 77 BPM | SYSTOLIC BLOOD PRESSURE: 132 MMHG | DIASTOLIC BLOOD PRESSURE: 84 MMHG | BODY MASS INDEX: 23.64 KG/M2 | WEIGHT: 138.44 LBS | OXYGEN SATURATION: 99 %

## 2019-09-09 DIAGNOSIS — K08.89 TOOTH PAIN: Primary | ICD-10-CM

## 2019-09-09 PROCEDURE — 99213 OFFICE O/P EST LOW 20 MIN: CPT | Mod: S$GLB,,, | Performed by: STUDENT IN AN ORGANIZED HEALTH CARE EDUCATION/TRAINING PROGRAM

## 2019-09-09 PROCEDURE — 99213 PR OFFICE/OUTPT VISIT, EST, LEVL III, 20-29 MIN: ICD-10-PCS | Mod: S$GLB,,, | Performed by: STUDENT IN AN ORGANIZED HEALTH CARE EDUCATION/TRAINING PROGRAM

## 2019-09-09 PROCEDURE — 99999 PR PBB SHADOW E&M-EST. PATIENT-LVL III: ICD-10-PCS | Mod: PBBFAC,,, | Performed by: STUDENT IN AN ORGANIZED HEALTH CARE EDUCATION/TRAINING PROGRAM

## 2019-09-09 PROCEDURE — 99999 PR PBB SHADOW E&M-EST. PATIENT-LVL III: CPT | Mod: PBBFAC,,, | Performed by: STUDENT IN AN ORGANIZED HEALTH CARE EDUCATION/TRAINING PROGRAM

## 2019-09-09 RX ORDER — AMOXICILLIN 500 MG/1
500 CAPSULE ORAL EVERY 8 HOURS
Qty: 15 CAPSULE | Refills: 0 | Status: SHIPPED | OUTPATIENT
Start: 2019-09-09 | End: 2019-09-14

## 2019-09-09 NOTE — PROGRESS NOTES
"Internal Medicine Resident Clinic Note  9/9/2019      Subjective:       Patient ID:  Ruby is a 59 y.o. female being seen for an established visit.    Chief Complaint: Dental Pain    Ms. Angela is a 59 year old woman with anxiety and migraines who presents with toothache. She been having this dental pain for the past few months as she has not been able to see a dentist or orthodontist to address this issue. She was recently seen last month for dental pain and was prescribed lidocaine. She has been using lidocaine and orogel, but they have not been really helping. She says that she has a dentist appointment in [place] this Friday or next Monday. She is here for her  as he recently had a neck stent placed as he is a liver transplant. She has the same tooth pain on the left side on the bottom. The dentist was not sure if they're going to remove it or do a root canal. She was told to go to the orthodentist. The pain is at a 3-4 and she mostly notices it when the "wind hits it". She says that it "throbs" all the time. She has been trying Tylenol but says that has not been helping either. The dentist gave her Tramadol last time and she would like re-fills. She wants something to help her "get through the pain" until she sees the orthodontist. She received antibiotics (she states Amoxicillin) and says that she has been taking them "on and off" since about 3-4 weeks ago. She last took it 2 days ago.      Review of Systems   Constitutional: Negative for chills and fever.   HENT: Negative for congestion and sore throat.         Tooth pain   Eyes: Negative for photophobia and pain.   Respiratory: Negative for cough and shortness of breath.    Cardiovascular: Negative for chest pain and leg swelling.   Gastrointestinal: Negative for abdominal pain and nausea.   Genitourinary: Negative for dysuria and flank pain.   Musculoskeletal: Negative for joint pain and myalgias.   Neurological: Negative for dizziness and headaches. " "  Psychiatric/Behavioral: Negative for depression. The patient is not nervous/anxious.        Past Medical History:   Diagnosis Date    Anxiety     Migraines        No family history on file.     reports that she has been smoking cigarettes.  She has been smoking about 0.50 packs per day. She has never used smokeless tobacco. She reports that she does not drink alcohol or use drugs.    Medication List with Changes/Refills   New Medications    AMOXICILLIN (AMOXIL) 500 MG CAPSULE    Take 1 capsule (500 mg total) by mouth every 8 (eight) hours. for 5 days   Current Medications    BUTALBITAL-ACETAMINOPHEN-CAFF -40 MG CAP    Take 1 capsule by mouth once daily as needed for headache    CLONAZEPAM (KLONOPIN) 1 MG TABLET    Take 0.5 tablets (0.5 mg total) by mouth 2 (two) times daily as needed for Anxiety.    HYDROXYZINE HCL (ATARAX) 25 MG TABLET    Take 1 tablet (25 mg total) by mouth 3 (three) times daily as needed for Itching or Anxiety.    LIDOCAINE HCL 2% (XYLOCAINE) 2 % SOLN    by Mucous Membrane route every 6 (six) hours.    PAROXETINE (PAXIL) 10 MG TABLET    Take 1 tablet (10 mg total) by mouth every morning.    PROPRANOLOL (INDERAL LA) 60 MG 24 HR CAPSULE    Take 1 capsule (60 mg total) by mouth once daily. For prevention of headaches/migraines    RIZATRIPTAN (MAXALT) 5 MG TABLET    Take 1 tablet (5 mg total) by mouth as needed for Migraine.    SODIUM CHLORIDE (OCEAN NASAL) 0.65 % NASAL SPRAY    1 spray by Nasal route as needed for Congestion.     Review of patient's allergies indicates:   Allergen Reactions    Naproxen Hives and Itching       Patient Active Problem List   Diagnosis    Anxiety    Tobacco use    Migraines    DAIN (generalized anxiety disorder)    Tooth pain           Objective:      /84 (BP Location: Left arm, Patient Position: Sitting, BP Method: Medium (Manual))   Pulse 77   Ht 5' 4" (1.626 m)   Wt 62.8 kg (138 lb 7.2 oz)   SpO2 99%   BMI 23.76 kg/m²   Estimated body mass " "index is 23.76 kg/m² as calculated from the following:    Height as of this encounter: 5' 4" (1.626 m).    Weight as of this encounter: 62.8 kg (138 lb 7.2 oz).  Physical Exam   Constitutional: She is oriented to person, place, and time and well-developed, well-nourished, and in no distress. No distress.   HENT:   Head: Normocephalic and atraumatic.   Mouth/Throat:       Eyes: EOM are normal.   Neck: Normal range of motion. Neck supple.   Cardiovascular: Normal rate and regular rhythm.   No murmur heard.  Pulmonary/Chest: Effort normal and breath sounds normal. No respiratory distress.   Abdominal: Soft. Bowel sounds are normal. She exhibits no distension. There is no tenderness.   Musculoskeletal: Normal range of motion. She exhibits no edema.   Neurological: She is alert and oriented to person, place, and time.   Skin: Skin is warm and dry.   Psychiatric: Judgment normal.   A bit tearful         Assessment and Plan:         Ruby was seen today for dental pain.    Diagnoses and all orders for this visit:    Tooth pain  -     amoxicillin (AMOXIL) 500 MG capsule; Take 1 capsule (500 mg total) by mouth every 8 (eight) hours. for 5 days    Discussed with patient that taking Tramadol in addition to her Fiorcet and Klonopin was not safe as it could lead to decreased alertness and falls. Patient understood and stated that she wanted to be safe. From reviewing  with staff, patient has been prescribed short courses of Tramadol for the dental pain. Encouraged patient to take antibiotics as prescribed everyday for the next 5 days as well as Tylenol as needed for pain and follow up with her orthodontist this Friday to address her dental issue.    Follow up if symptoms worsen or fail to improve.    Other Orders Placed This Visit:  No orders of the defined types were placed in this encounter.          Patient seen and discussed with Dr. Medel.        Sonya Hernandez MD PGY2  [facility]  Internal Medicine  "

## 2019-09-10 NOTE — PROGRESS NOTES
I have personally taken the history and examined this patient and agree with Dr. Hernandez's note.  Keep appointment with Dentist/Orthodontist.  Will give 5 day course of amoxicillin (patient previously did not complete course and took pills days apart).  She feels she can tolerate the pain with oral gel and OTC tylenol.  I expressed concern refilling her Tramadol given her Fioricet and Clonazepam use and risk of sedation.  She has requested a refill of clonazepam which I will defer to her PCP.  RTC to clinic for fever, chills or swelling at site.      Juice Medel M.D.  Internal Medicine   Pager [phone]